# Patient Record
Sex: MALE | Race: OTHER
[De-identification: names, ages, dates, MRNs, and addresses within clinical notes are randomized per-mention and may not be internally consistent; named-entity substitution may affect disease eponyms.]

---

## 2020-07-29 ENCOUNTER — HOSPITAL ENCOUNTER (OUTPATIENT)
Dept: HOSPITAL 72 - SUR | Age: 75
Discharge: HOME | End: 2020-07-29
Payer: MEDICARE

## 2020-07-29 VITALS — WEIGHT: 195 LBS | HEIGHT: 71 IN | BODY MASS INDEX: 27.3 KG/M2

## 2020-07-29 VITALS — SYSTOLIC BLOOD PRESSURE: 132 MMHG | DIASTOLIC BLOOD PRESSURE: 80 MMHG

## 2020-07-29 VITALS — SYSTOLIC BLOOD PRESSURE: 164 MMHG | DIASTOLIC BLOOD PRESSURE: 68 MMHG

## 2020-07-29 VITALS — DIASTOLIC BLOOD PRESSURE: 72 MMHG | SYSTOLIC BLOOD PRESSURE: 141 MMHG

## 2020-07-29 VITALS — SYSTOLIC BLOOD PRESSURE: 139 MMHG | DIASTOLIC BLOOD PRESSURE: 74 MMHG

## 2020-07-29 VITALS — SYSTOLIC BLOOD PRESSURE: 154 MMHG | DIASTOLIC BLOOD PRESSURE: 75 MMHG

## 2020-07-29 VITALS — SYSTOLIC BLOOD PRESSURE: 153 MMHG | DIASTOLIC BLOOD PRESSURE: 77 MMHG

## 2020-07-29 VITALS — DIASTOLIC BLOOD PRESSURE: 60 MMHG | SYSTOLIC BLOOD PRESSURE: 111 MMHG

## 2020-07-29 VITALS — SYSTOLIC BLOOD PRESSURE: 127 MMHG | DIASTOLIC BLOOD PRESSURE: 72 MMHG

## 2020-07-29 VITALS — DIASTOLIC BLOOD PRESSURE: 80 MMHG | SYSTOLIC BLOOD PRESSURE: 140 MMHG

## 2020-07-29 DIAGNOSIS — I10: ICD-10-CM

## 2020-07-29 DIAGNOSIS — H25.11: Primary | ICD-10-CM

## 2020-07-29 DIAGNOSIS — H25.041: ICD-10-CM

## 2020-07-29 DIAGNOSIS — Z79.4: ICD-10-CM

## 2020-07-29 DIAGNOSIS — K21.9: ICD-10-CM

## 2020-07-29 DIAGNOSIS — H25.011: ICD-10-CM

## 2020-07-29 DIAGNOSIS — E11.9: ICD-10-CM

## 2020-07-29 PROCEDURE — 66984 XCAPSL CTRC RMVL W/O ECP: CPT

## 2020-07-29 PROCEDURE — 94150 VITAL CAPACITY TEST: CPT

## 2020-07-29 PROCEDURE — 82962 GLUCOSE BLOOD TEST: CPT

## 2020-07-29 PROCEDURE — 94003 VENT MGMT INPAT SUBQ DAY: CPT

## 2020-07-29 RX ADMIN — CIPROFLOXACIN HYDROCHLORIDE SCH DROP: 3 SOLUTION/ DROPS OPHTHALMIC at 09:05

## 2020-07-29 RX ADMIN — PHENYLEPHRINE HYDROCHLORIDE SCH DROP: 100 SOLUTION/ DROPS OPHTHALMIC at 08:45

## 2020-07-29 RX ADMIN — Medication SCH DROP: at 08:31

## 2020-07-29 RX ADMIN — DICLOFENAC SODIUM SCH DROP: 1 SOLUTION/ DROPS OPHTHALMIC at 09:05

## 2020-07-29 RX ADMIN — TOBRAMYCIN AND DEXAMETHASONE SCH DROP: 3; 1 SUSPENSION/ DROPS OPHTHALMIC at 09:05

## 2020-07-29 RX ADMIN — DICLOFENAC SODIUM SCH DROP: 1 SOLUTION/ DROPS OPHTHALMIC at 08:30

## 2020-07-29 RX ADMIN — CIPROFLOXACIN HYDROCHLORIDE SCH DROP: 3 SOLUTION/ DROPS OPHTHALMIC at 08:30

## 2020-07-29 RX ADMIN — LIDOCAINE HYDROCHLORIDE SCH DROP: 35 GEL OPHTHALMIC at 09:04

## 2020-07-29 RX ADMIN — LIDOCAINE HYDROCHLORIDE SCH DROP: 35 GEL OPHTHALMIC at 08:31

## 2020-07-29 RX ADMIN — CIPROFLOXACIN HYDROCHLORIDE SCH DROP: 3 SOLUTION/ DROPS OPHTHALMIC at 08:45

## 2020-07-29 RX ADMIN — LIDOCAINE HYDROCHLORIDE SCH DROP: 35 GEL OPHTHALMIC at 08:45

## 2020-07-29 RX ADMIN — DICLOFENAC SODIUM SCH DROP: 1 SOLUTION/ DROPS OPHTHALMIC at 08:48

## 2020-07-29 RX ADMIN — PHENYLEPHRINE HYDROCHLORIDE SCH DROP: 100 SOLUTION/ DROPS OPHTHALMIC at 08:31

## 2020-07-29 RX ADMIN — TOBRAMYCIN AND DEXAMETHASONE SCH DROP: 3; 1 SUSPENSION/ DROPS OPHTHALMIC at 08:45

## 2020-07-29 RX ADMIN — PHENYLEPHRINE HYDROCHLORIDE SCH DROP: 100 SOLUTION/ DROPS OPHTHALMIC at 09:04

## 2020-07-29 RX ADMIN — Medication SCH DROP: at 09:05

## 2020-07-29 RX ADMIN — CYCLOPENTOLATE HYDROCHLORIDE SCH DROP: 10 SOLUTION OPHTHALMIC at 08:48

## 2020-07-29 RX ADMIN — Medication SCH DROP: at 08:45

## 2020-07-29 RX ADMIN — CYCLOPENTOLATE HYDROCHLORIDE SCH DROP: 10 SOLUTION OPHTHALMIC at 08:31

## 2020-07-29 RX ADMIN — TOBRAMYCIN AND DEXAMETHASONE SCH DROP: 3; 1 SUSPENSION/ DROPS OPHTHALMIC at 08:30

## 2020-07-29 RX ADMIN — CYCLOPENTOLATE HYDROCHLORIDE SCH DROP: 10 SOLUTION OPHTHALMIC at 09:05

## 2020-07-29 NOTE — IMMEDIATE POST-OP EVALUATION
Immediate Post-Op Evalulation


Immediate Post-Op Evalulation


Procedure:  R eye cataract extraction with IOL


Date of Evaluation:  Jul 29, 2020


Time of Evaluation:  11:30


IV Fluids:  500


Blood Products:  none


Estimated Blood Loss:  none


Urinary Output:  none


Blood Pressure Systolic:  153


Blood Pressure Diastolic:  67


Pulse Rate:  64


Respiratory Rate:  20


O2 Sat by Pulse Oximetry:  99


Temperature (Fahrenheit):  97.5


Pain Score (1-10):  1


Nausea:  No


Vomiting:  No


Complications


none


Patient Status:  awake, patent, none


Hydration Status:  adequate











Donald See MD Jul 29, 2020 11:31

## 2020-07-29 NOTE — PRE-PROCEDURE NOTE/ATTESTATION
Pre-Procedure Note/Attestation


Complete Prior to Procedure


Planned Procedure:  right - Removal of cataract and placement of intraocular 

lens, right eye


Procedure Narrative:


Removal of cataract and placement of intraocular lens, right eye





Indications for Procedure


Pre-Operative Diagnosis:


Cataract, combined, right eye





Attestation


I attest that I discussed the nature of the procedure; its benefits; risks and 

complications; and alternatives (and the risks and benefits of such alternatives

), prior to the procedure, with the patient (or the patient's legal 

representative).





I attest that, if there was a reasonable possibility of needing a blood 

transfusion, the patient (or the patient's legal representative) was given the 

[place] [employer] standardized written summary, pursuant 

to the Dante [place] Blood Safety Act ([place] Health and Safety Code # 1645, as 

amended).





I attest that I re-evaluated the patient just prior to the surgery and that 

there has been no change in the patient's H&P, except as documented below:











Jose Michel MD Jul 29, 2020 06:35

## 2020-07-29 NOTE — 48 HOUR POST ANESTHESIA EVAL
Post Anesthesia Evaluation


Procedure:  R eye cataract extraction with IOL


Date of Evaluation:  Jul 29, 2020


Time of Evaluation:  12:06


Blood Pressure Systolic:  128


0:  76


Pulse Rate:  64


Respiratory Rate:  18


Temperature (Fahrenheit):  97.5


O2 Sat by Pulse Oximetry:  98


Airway:  patent


Nausea:  No


Vomiting:  No


Pain Intensity:  1


Hydration Status:  adequate


Cardiopulmonary Status:


stable


Mental Status/LOC:  patient returned to baseline


Follow-up Care/Observations:


n/a


Post-Anesthesia Complications:


none


Follow-up care needed:  ready to discharge











Donald See MD Jul 29, 2020 12:07

## 2020-07-29 NOTE — DISCHARGE INSTRUCTIONS
Discharge Instructions


Discharge Instructions


Follow Up Orders


Continue preop eye drops


Wear shield at all times except to place eye drops


Followup in Dr Michel's office tomorrow





For Congestive Heart Failure


Reminder


Report to your physician any weight gain of 5 pounds or more in one week.











Jose Michel MD Jul 29, 2020 11:28

## 2020-07-29 NOTE — ANETHESIA PREOPERATIVE EVAL
Anesthesia Pre-op PMH/ROS


General


Date of Evaluation:  Jul 29, 2020


Time of Evaluation:  10:35


Anesthesiologist:  Mayi


ASA Score:  ASA 3


Mallampati Score


Class I : Soft palate, uvula, fauces, pillars visible


Class II: Soft palate, uvula, fauces visible


Class III: Soft palate, base of uvula visible


Class IV: Only hard plate visible


Mallampati Classification:  Class II


Surgeon:  Marilu


Diagnosis:  R eye cataract


Surgical Procedure:  Cataract extraction


Anesthesia History:  none


Social History:  smoking - h/o


Family History:  no anesthesia problems


Allergies:  


Coded Allergies:  


     No Known Allergies (Unverified , 7/27/20)


Medications:  see eMAR


Patient NPO?:  Yes





Past Medical History


Cardiovascular:  Reports: HTN, arrhythmia - h/oA fib; 


   Denies: CAD, MI, valve dz, other


Pulmonary:  Denies: asthma, COPD, LANI, other


Gastrointestinal/Genitourinary:  Reports: GERD, CRI; 


   Denies: ESRD, other


Neurologic/Psychiatric:  Denies: dementia, CVA, depression/anxiety, TIA, other


Endocrine:  Reports: DM - on i; 


   Denies: hypothyroidism, steroids, other


HEENT:  Reports: cataract (L) - s/p Sx; 


   Denies: cataract (R), glaucoma, [place] (L), [place] (R), other


Hematology/Immune:  Denies: anemia, DVT, bleeding disorder, other


Musculoskeletal/Integumentary:  Reports: OA; 


   Denies: RA, DJD, DDD, edema, other


PMH Narrative:


as above


PSxH Narrative:


L cataract, ex lap





Anesthesia Pre-op Phys. Exam


Physician Exam





Last Vital Signs








  Date Time  Temp Pulse Resp B/P (MAP) Pulse Ox O2 Delivery O2 Flow Rate FiO2


 


7/29/20 08:55      Room Air  


 


7/29/20 08:51 96.9 66 18 164/68 98   








Constitutional:  NAD


Neurologic:  CN 2-12 intact


Cardiovascular:  RRR, no M/R/G


Respiratory:  CTA


Gastrointestinal:  S/NT/ND





Airway Exam


Mallampati Score:  Class II


MO:  full


Neck:  stiff


ROM:  full


Teeth:  missing


Dentures:  no upper, no lower





Anesthesia Pre-op A/P


Labs





Chemistry








Test


  7/29/20


08:38


 


POC Whole Blood Glucose Pending  











Studies


Pre-op Studies:  EKG - SR





Risk Assessment & Plan


Assessment:


ASA 3


Plan:


MAC


Status Change Before Surgery:  Donald Ambriz MD Jul 29, 2020 10:58

## 2020-07-29 NOTE — OPERATIVE NOTE - DICTATED
DATE OF OPERATION:  07/29/2020

SURGEON:  Jose Michel MD.



ASSISTANT SURGEON:  None.



ANESTHESIOLOGIST:  Donald See MD.



ANESTHESIA:  Local/standby/monitored anesthesia care.



PREOPERATIVE DIAGNOSIS:  Cataract, combined, right eye.



POSTOPERATIVE DIAGNOSIS:  Cataract, combined, right eye.



PROCEDURE:

1. Phacoemulsification of cataract, right eye.

2. Placement of posterior chamber intraocular lens, right eye.



SPECIMENS:  None.



COMPLICATIONS:  None.



INDICATIONS FOR SURGERY:  The patient has had painless progressive decrease

in visual acuity in the right eye secondary to cataract.  The patient

understands the risks of surgery including infection, bleeding, need for

further surgery, loss of vision, no improvement in vision, loss of the

eye, loss of life, glaucoma, and retinal detachment.  He understands these

risks and elects to proceed with surgery.



FINDINGS:  The patient had a +3 nuclear sclerotic cataract as well as +3

posterior subcapsular cataract, and a +1 cortical cataract.



OPERATIVE NOTE:  After informed consent was obtained, the patient was

brought into the operative room, placed in supine position.  Cardiac and

respiratory monitors were attached.  A time-out was performed and all

criteria were met.  Everyone in the room agreed.  The right eye was then

draped and prepped in sterile manner for ocular surgery.  A lid speculum

was placed in the eye.  A 1% lidocaine preservative-free was injected at

the approximate 8:30 limbus.  A conjunctiva peritomy from approximately

8:30 to 9:30 was made and dissected posteriorly.  Hemostasis was

maintained with bipolar cautery.  A 2.6 mm limbal incision was made

centered at approximately 8:30 and dissected anteriorly.  A paracentesis

was made at approximately 12 o'clock and Shugarcaine was injected into the

anterior chamber followed by Healon.  The anterior chamber was then

entered using a 2.6 mm keratome through the limbal incision.  An anterior

capsulorrhexis was then performed.  Hydrodissection and hydrodelineation

of the lens was then performed.  The lens was then phacoemulsified using a

divide and conquer four-quadrant technique.  Residual cortical material

was then aspirated.  After removal of the nuclear material, there was

noted to be an anterior capsular rent at approximately 7 o'clock, but this

did not go past the equator and the edges were visibly observed

anteriorly.  Healon was injected into the anterior chamber and capsular

bag.  The lens was taken from its package, placed into the cartridge and

the tip of the cartridge was placed through the limbal incision.  The lens

was injected into the capsular bag and centered nicely with the [place]

hook.  It was oriented so as perpendicular to the 7 o'clock runs.  Healon

was aspirated from the anterior chamber and capsular bag.  One 10-0 nylon

interrupted suture was then placed through the limbal incision.  The knot

was rotated and buried.  Care was taken during the entire procedure not to

touch the endothelium.  The wounds were checked and found to be watertight

and there were also hydrated closed.  The lid speculum drapes were removed

from the eye and the eye was examined again and the lens was noted to be

aligned perpendicular to the 7 o'clock runs with both haptics and the

optic in the capsular bag.  After the lid speculum again was removed, the

antibiotic drops of TobraDex, ciprofloxacin, and Pred Forte were applied

to the eye followed by Maxitrol ointment and a shield.  The patient

tolerated the procedure well and left the operating room awake, alert, and

in stable condition.









  ______________________________________________

  Jose Michel M.D.





DR:  ENMANUEL

D:  07/29/2020 11:36

T:  07/29/2020 15:06

JOB#:  [id number]

CC:

## 2020-07-29 NOTE — BRIEF OPERATIVE NOTE
Immediate Post Operative Note


Operative Note


Pre-op Diagnosis:


Cataract, combined, right eye


Procedure:


Phaco PC IOL OD


Post-op Diagnosis:  same as pre-op


Surgeon:  JACE Michel MD


Assistant:  none


Anesthesiologist:  Dr See


Anesthesia:  local, MAC


Specimen:  none


Complications:  none


Fluids:  see chart


Implant(s) used?:  Yes - Abbott [id number] 21.0











Jose Michel MD Jul 29, 2020 11:29